# Patient Record
Sex: FEMALE | Race: WHITE
[De-identification: names, ages, dates, MRNs, and addresses within clinical notes are randomized per-mention and may not be internally consistent; named-entity substitution may affect disease eponyms.]

---

## 2021-01-13 ENCOUNTER — HOSPITAL ENCOUNTER (OUTPATIENT)
Dept: HOSPITAL 33 - SDC-PAIN | Age: 36
Discharge: HOME | End: 2021-01-13
Attending: PSYCHIATRY & NEUROLOGY
Payer: COMMERCIAL

## 2021-01-13 DIAGNOSIS — I10: ICD-10-CM

## 2021-01-13 DIAGNOSIS — M47.816: Primary | ICD-10-CM

## 2021-01-13 DIAGNOSIS — Z79.899: ICD-10-CM

## 2021-01-13 PROCEDURE — 72020 X-RAY EXAM OF SPINE 1 VIEW: CPT

## 2021-01-13 PROCEDURE — 77002 NEEDLE LOCALIZATION BY XRAY: CPT

## 2021-01-13 NOTE — XRAY
Indication: Left L3-S1 MBB.



Intraoperative fluoroscopy was provided for 18 seconds.  Single digital spot

image submitted for interpretation demonstrates posterior needle tips

projecting over the expected left L3-S1 nerve roots.  Correlate with

intraoperative findings/report.

## 2021-03-10 ENCOUNTER — HOSPITAL ENCOUNTER (OUTPATIENT)
Dept: HOSPITAL 33 - SDC-PAIN | Age: 36
Discharge: HOME | End: 2021-03-10
Attending: PSYCHIATRY & NEUROLOGY
Payer: COMMERCIAL

## 2021-03-10 DIAGNOSIS — M47.816: Primary | ICD-10-CM

## 2021-03-10 DIAGNOSIS — F32.9: ICD-10-CM

## 2021-03-10 DIAGNOSIS — M47.9: ICD-10-CM

## 2021-03-10 DIAGNOSIS — J45.909: ICD-10-CM

## 2021-03-10 DIAGNOSIS — Z79.899: ICD-10-CM

## 2021-03-10 DIAGNOSIS — D64.9: ICD-10-CM

## 2021-03-10 DIAGNOSIS — F41.9: ICD-10-CM

## 2021-03-10 DIAGNOSIS — I10: ICD-10-CM

## 2021-03-10 PROCEDURE — 64494 INJ PARAVERT F JNT L/S 2 LEV: CPT

## 2021-03-10 PROCEDURE — 64493 INJ PARAVERT F JNT L/S 1 LEV: CPT

## 2021-03-10 PROCEDURE — 77002 NEEDLE LOCALIZATION BY XRAY: CPT

## 2021-03-10 PROCEDURE — 72020 X-RAY EXAM OF SPINE 1 VIEW: CPT

## 2021-03-10 PROCEDURE — 64495 INJ PARAVERT F JNT L/S 3 LEV: CPT

## 2021-03-10 NOTE — XRAY
Indication: Right L3-S1 MBB.



Intraoperative fluoroscopy provided for 17 seconds.  Single digital spot image

submitted for interpretation demonstrates posterior needle tips projecting

over the expected right L3-S1 nerve roots.  Correlate with intraoperative

findings/report.

## 2021-05-05 ENCOUNTER — HOSPITAL ENCOUNTER (OUTPATIENT)
Dept: HOSPITAL 33 - SDC-PAIN | Age: 36
Discharge: HOME | End: 2021-05-05
Attending: PSYCHIATRY & NEUROLOGY
Payer: COMMERCIAL

## 2021-05-05 DIAGNOSIS — J45.909: ICD-10-CM

## 2021-05-05 DIAGNOSIS — I10: ICD-10-CM

## 2021-05-05 DIAGNOSIS — F41.9: ICD-10-CM

## 2021-05-05 DIAGNOSIS — F32.9: ICD-10-CM

## 2021-05-05 DIAGNOSIS — M19.90: ICD-10-CM

## 2021-05-05 DIAGNOSIS — M47.816: Primary | ICD-10-CM

## 2021-05-05 DIAGNOSIS — Z79.899: ICD-10-CM

## 2021-05-05 PROCEDURE — 77002 NEEDLE LOCALIZATION BY XRAY: CPT

## 2021-05-05 PROCEDURE — 72020 X-RAY EXAM OF SPINE 1 VIEW: CPT

## 2021-05-05 NOTE — XRAY
Indication: Left L4-S1 MBB.



Intraoperative fluoroscopy provided for 29 seconds.  Single digital spot image

submitted for interpretation demonstrates posterior needle tips projecting

over the expected left L3-S1 nerve roots.  Correlate with intraoperative

findings/report.